# Patient Record
Sex: FEMALE | Race: OTHER | Employment: OTHER | URBAN - METROPOLITAN AREA
[De-identification: names, ages, dates, MRNs, and addresses within clinical notes are randomized per-mention and may not be internally consistent; named-entity substitution may affect disease eponyms.]

---

## 2017-03-01 ENCOUNTER — OFFICE VISIT (OUTPATIENT)
Dept: CARDIOLOGY CLINIC | Age: 77
End: 2017-03-01

## 2017-03-01 DIAGNOSIS — I42.9 CARDIOMYOPATHY (HCC): ICD-10-CM

## 2017-03-01 DIAGNOSIS — I50.22 SYSTOLIC CHF, CHRONIC (HCC): ICD-10-CM

## 2017-03-01 DIAGNOSIS — Z95.810 AICD (AUTOMATIC CARDIOVERTER/DEFIBRILLATOR) PRESENT: Primary | ICD-10-CM

## 2017-05-31 ENCOUNTER — OFFICE VISIT (OUTPATIENT)
Dept: CARDIOLOGY CLINIC | Age: 77
End: 2017-05-31

## 2017-05-31 DIAGNOSIS — Z95.810 AICD (AUTOMATIC CARDIOVERTER/DEFIBRILLATOR) PRESENT: Primary | ICD-10-CM

## 2017-05-31 DIAGNOSIS — I50.22 SYSTOLIC CHF, CHRONIC (HCC): ICD-10-CM

## 2017-05-31 DIAGNOSIS — I42.9 CARDIOMYOPATHY, UNSPECIFIED TYPE (HCC): ICD-10-CM

## 2017-05-31 NOTE — LETTER
5/31/2017 2:09 PM 
 
Ms. [address] [facility] [address] Dear Ms. Cathy Zamora: 
 
Your remote home monitor is functioning properly. You are due for your annual ICD check and visit with Dr. Aline Carroll after June 9, 2017. Please call our office at [phone] and schedule a follow up appointment for your continued care. Sincerely, Danilo Mina RN [address] [place] [address] Cardiology

## 2017-05-31 NOTE — PROGRESS NOTES
See device report - BSC BiV ICD remote send, next remote send in 3 months. Annual reminder letter sent.

## 2017-06-15 ENCOUNTER — CLINICAL SUPPORT (OUTPATIENT)
Dept: CARDIOLOGY CLINIC | Age: 77
End: 2017-06-15

## 2017-06-15 ENCOUNTER — OFFICE VISIT (OUTPATIENT)
Dept: CARDIOLOGY CLINIC | Age: 77
End: 2017-06-15

## 2017-06-15 VITALS
SYSTOLIC BLOOD PRESSURE: 146 MMHG | HEIGHT: 60 IN | WEIGHT: 167.8 LBS | BODY MASS INDEX: 32.94 KG/M2 | DIASTOLIC BLOOD PRESSURE: 70 MMHG | RESPIRATION RATE: 18 BRPM | OXYGEN SATURATION: 97 % | HEART RATE: 62 BPM

## 2017-06-15 DIAGNOSIS — I50.22 SYSTOLIC CHF, CHRONIC (HCC): Primary | ICD-10-CM

## 2017-06-15 DIAGNOSIS — I42.9 CARDIOMYOPATHY, UNSPECIFIED TYPE (HCC): ICD-10-CM

## 2017-06-15 DIAGNOSIS — I10 ESSENTIAL HYPERTENSION: ICD-10-CM

## 2017-06-15 DIAGNOSIS — I25.10 CORONARY ARTERY DISEASE INVOLVING NATIVE CORONARY ARTERY OF NATIVE HEART WITHOUT ANGINA PECTORIS: ICD-10-CM

## 2017-06-15 DIAGNOSIS — I50.22 SYSTOLIC CHF, CHRONIC (HCC): ICD-10-CM

## 2017-06-15 DIAGNOSIS — Z95.810 AICD (AUTOMATIC CARDIOVERTER/DEFIBRILLATOR) PRESENT: ICD-10-CM

## 2017-06-15 DIAGNOSIS — Z95.810 AICD (AUTOMATIC CARDIOVERTER/DEFIBRILLATOR) PRESENT: Primary | ICD-10-CM

## 2017-06-15 RX ORDER — LANOLIN ALCOHOL/MO/W.PET/CERES
1000 CREAM (GRAM) TOPICAL DAILY
COMMUNITY

## 2017-08-30 ENCOUNTER — OFFICE VISIT (OUTPATIENT)
Dept: CARDIOLOGY CLINIC | Age: 77
End: 2017-08-30

## 2017-08-30 DIAGNOSIS — I50.22 SYSTOLIC CHF, CHRONIC (HCC): ICD-10-CM

## 2017-08-30 DIAGNOSIS — Z95.810 AICD (AUTOMATIC CARDIOVERTER/DEFIBRILLATOR) PRESENT: Primary | ICD-10-CM

## 2017-08-30 DIAGNOSIS — I42.9 CARDIOMYOPATHY, UNSPECIFIED TYPE (HCC): ICD-10-CM

## 2017-11-29 ENCOUNTER — OFFICE VISIT (OUTPATIENT)
Dept: CARDIOLOGY CLINIC | Age: 77
End: 2017-11-29

## 2017-11-29 DIAGNOSIS — Z95.810 AICD (AUTOMATIC CARDIOVERTER/DEFIBRILLATOR) PRESENT: Primary | ICD-10-CM

## 2017-11-29 DIAGNOSIS — I42.9 CARDIOMYOPATHY, UNSPECIFIED TYPE (HCC): ICD-10-CM

## 2017-11-29 DIAGNOSIS — I50.22 SYSTOLIC CHF, CHRONIC (HCC): ICD-10-CM

## 2018-02-28 ENCOUNTER — OFFICE VISIT (OUTPATIENT)
Dept: CARDIOLOGY CLINIC | Age: 78
End: 2018-02-28

## 2018-02-28 DIAGNOSIS — I42.9 CARDIOMYOPATHY, UNSPECIFIED TYPE (HCC): ICD-10-CM

## 2018-02-28 DIAGNOSIS — Z95.810 AICD (AUTOMATIC CARDIOVERTER/DEFIBRILLATOR) PRESENT: Primary | ICD-10-CM

## 2018-02-28 DIAGNOSIS — I50.22 SYSTOLIC CHF, CHRONIC (HCC): ICD-10-CM

## 2018-06-06 ENCOUNTER — CLINICAL SUPPORT (OUTPATIENT)
Dept: CARDIOLOGY CLINIC | Age: 78
End: 2018-06-06

## 2018-06-06 DIAGNOSIS — Z95.810 AICD (AUTOMATIC CARDIOVERTER/DEFIBRILLATOR) PRESENT: Primary | ICD-10-CM

## 2018-06-06 DIAGNOSIS — I50.22 SYSTOLIC CHF, CHRONIC (HCC): ICD-10-CM

## 2018-06-06 DIAGNOSIS — I42.9 CARDIOMYOPATHY, UNSPECIFIED TYPE (HCC): ICD-10-CM

## 2018-09-05 ENCOUNTER — CLINICAL SUPPORT (OUTPATIENT)
Dept: CARDIOLOGY CLINIC | Age: 78
End: 2018-09-05

## 2018-09-05 DIAGNOSIS — I42.9 CARDIOMYOPATHY, UNSPECIFIED TYPE (HCC): ICD-10-CM

## 2018-09-05 DIAGNOSIS — Z95.810 PRESENCE OF AUTOMATIC CARDIOVERTER/DEFIBRILLATOR (AICD): Primary | ICD-10-CM

## 2018-11-13 ENCOUNTER — OFFICE VISIT (OUTPATIENT)
Dept: CARDIOLOGY CLINIC | Age: 78
End: 2018-11-13

## 2018-11-13 ENCOUNTER — CLINICAL SUPPORT (OUTPATIENT)
Dept: CARDIOLOGY CLINIC | Age: 78
End: 2018-11-13

## 2018-11-13 VITALS
DIASTOLIC BLOOD PRESSURE: 70 MMHG | WEIGHT: 161.5 LBS | RESPIRATION RATE: 18 BRPM | HEIGHT: 60 IN | HEART RATE: 76 BPM | SYSTOLIC BLOOD PRESSURE: 138 MMHG | BODY MASS INDEX: 31.71 KG/M2 | OXYGEN SATURATION: 97 %

## 2018-11-13 DIAGNOSIS — I42.0 DILATED CARDIOMYOPATHY (HCC): ICD-10-CM

## 2018-11-13 DIAGNOSIS — Z95.810 PRE-OPERATIVE CARDIOVASCULAR EXAMINATION, ICD IN PLACE: ICD-10-CM

## 2018-11-13 DIAGNOSIS — I44.7 LBBB (LEFT BUNDLE BRANCH BLOCK): ICD-10-CM

## 2018-11-13 DIAGNOSIS — I50.22 CHRONIC SYSTOLIC CONGESTIVE HEART FAILURE (HCC): ICD-10-CM

## 2018-11-13 DIAGNOSIS — Z95.810 AICD (AUTOMATIC CARDIOVERTER/DEFIBRILLATOR) PRESENT: Primary | ICD-10-CM

## 2018-11-13 DIAGNOSIS — Z95.810 PRESENCE OF AUTOMATIC CARDIOVERTER/DEFIBRILLATOR (AICD): Primary | ICD-10-CM

## 2018-11-13 DIAGNOSIS — Z01.810 PRE-OPERATIVE CARDIOVASCULAR EXAMINATION, ICD IN PLACE: ICD-10-CM

## 2018-11-13 DIAGNOSIS — I50.22 SYSTOLIC CHF, CHRONIC (HCC): ICD-10-CM

## 2018-11-13 NOTE — PROGRESS NOTES
1. Have you been to the ER, urgent care clinic since your last visit? Hospitalized since your last visit? No 
 
2. Have you seen or consulted any other health care providers outside of the [address] [place] [address] since your last visit? Include any pap smears or colon screening. No 
 
Chief Complaint Patient presents with  Pacemaker Check AICD check up. Denied cardiac symptoms.

## 2018-11-13 NOTE — PROGRESS NOTES
Subjective:  
  
Rafaela Isaac is a 77 y.o. female is here for annual device follow up. The patient denies chest pain/ shortness of breath, orthopnea, PND, LE edema, palpitations, syncope, presyncope or fatigue. Patient Active Problem List  
 Diagnosis Date Noted  Hypertension 06/15/2017  [facility] ([facility] 06/04/2015  Hypothyroidism 04/03/2013  Accelerated hypertension 03/09/2011  Thrombocytopenia ([facility] 03/07/2011  Anemia 03/07/2011  
 D. I.C. (disseminated intravascular coagulation) 03/07/2011  Systolic CHF, chronic ([facility] 03/04/2011  AICD (automatic cardioverter/defibrillator) present 03/04/2011  CAD (coronary artery disease) 09/28/2010  Hyperlipidemia 09/28/2010 Haylie Whitney MD 
Past Medical History:  
Diagnosis Date  Hypercholesterolemia  Hypertension  Thyroid disease Past Surgical History:  
Procedure Laterality Date  CARDIAC SURG PROCEDURE UNLIST  2003  
 angioplasty  HX HEENT    
 cataract  HX PACEMAKER    
 2000/ angioplasty in 2003. Allergies Allergen Reactions  Statins-Hmg-Coa Reductase Inhibitors Myalgia Family History Problem Relation Age of Onset  Heart Disease Brother   
 negative for cardiac disease Social History Socioeconomic History  Marital status:  Spouse name: Not on file  Number of children: Not on file  Years of education: Not on file  Highest education level: Not on file Social Needs  Financial resource strain: Not on file  Food insecurity - worry: Not on file  Food insecurity - inability: Not on file  Transportation needs - medical: Not on file  Transportation needs - non-medical: Not on file Occupational History  Not on file Tobacco Use  Smoking status: Never Smoker  Smokeless tobacco: Never Used Substance and Sexual Activity  Alcohol use: No  
 Drug use: No  
 Sexual activity: No  
Other Topics Concern  Not on file Social History Narrative  Not on file Current Outpatient Medications Medication Sig  cyanocobalamin 1,000 mcg tablet Take 1,000 mcg by mouth daily.  b complex vitamins tablet Take 1 Tab by mouth daily.  ergocalciferol (ERGOCALCIFEROL) 50,000 unit capsule Take 50,000 Units by mouth.  furosemide (LASIX) 40 mg tablet TAKE 1 TABLET BY MOUTH EVERY DAY  metoprolol succinate (TOPROL-XL) 50 mg XL tablet TAKE 1 TABLET BY MOUTH EVERY DAY  losartan (COZAAR) 50 mg tablet TAKE 1 TABLET BY MOUTH EVERY DAY  levothyroxine (SYNTHROID) 75 mcg tablet Take 1 Tab by mouth Daily (before breakfast). For additional refills patient will need to be seen (Patient taking differently: Take 50 mcg by mouth Daily (before breakfast). For additional refills patient will need to be seen)  niacin 250 mg tablet Take 250 mg by mouth. THREE DAYS A WEEK No current facility-administered medications for this visit. Vitals:  
 11/13/18 1528 BP: 138/70 Pulse: 76 Resp: 18 SpO2: 97% Weight: 161 lb 8 oz (73.3 kg) Height: 5' (1.524 m) I have reviewed the nurses notes, vitals, problem list, allergy list, medical history, family, social history and medications. Review of Symptoms: 
 
General: Pt denies excessive weight gain or loss. Pt is able to conduct ADL's HEENT: Denies blurred vision, headaches, epistaxis and difficulty swallowing. Respiratory: Denies shortness of breath, MARKHAM, wheezing or stridor. Cardiovascular: Denies precordial pain, palpitations, edema or PND Gastrointestinal: Denies poor appetite, indigestion, abdominal pain or blood in stool Urinary: Denies dysuria, pyuria Musculoskeletal: Denies pain or swelling from muscles or joints Neurologic: Denies tremor, paresthesias, or sensory motor disturbance Skin: Denies rash, itching or texture change. Psych: Denies depression Physical Exam:   
 
General: Well developed, in no acute distress.  
HEENT: Eyes - PERRL, no jvd 
 Heart:  Normal S1/S2 negative S3 or S4. Regular, no murmur, gallop or rub.  
Respiratory: Clear bilaterally x 4, no wheezing or rales Abdomen:   Soft, non-tender, bowel sounds are active.  
Extremities:  No edema, normal cap refill, no cyanosis. Musculoskeletal: No clubbing Neuro: A&Ox3, speech clear, gait stable. Skin: Skin color is normal. No rashes or lesions. Non diaphoretic Vascular: 2+ pulses symmetric in all extremities Cardiographics Ekg: biv paced 
 
icd - 100% biv paced Results for orders placed or performed during the hospital encounter of 03/04/11 EKG, 12 LEAD, INITIAL Result Value Ref Range Ventricular Rate 81 BPM  
 Atrial Rate 81 BPM  
 P-R Interval 144 ms QRS Duration 142 ms  
 Q-T Interval 464 ms QTC Calculation (Bezet) 539 ms Calculated P Axis 70 degrees Calculated R Axis -143 degrees Calculated T Axis 2 degrees Diagnosis Electronic ventricular pacemaker No previous ECGs available Confirmed by Sandrine Luna ([id number]) on 3/5/2011 2:25:31 PM  
 
 
 
Lab Results Component Value Date/Time WBC 6.4 08/19/2014 09:57 AM  
 HGB 12.2 08/19/2014 09:57 AM  
 HCT 37.0 08/19/2014 09:57 AM  
 PLATELET 073 71/59/2441 09:57 AM  
 MCV 90 08/19/2014 09:57 AM  
  
Lab Results Component Value Date/Time Sodium 140 08/19/2014 09:57 AM  
 Potassium 4.4 08/19/2014 09:57 AM  
 Chloride 101 08/19/2014 09:57 AM  
 CO2 26 08/19/2014 09:57 AM  
 Anion gap 5 03/10/2011 04:00 AM  
 Glucose 100 (H) 08/19/2014 09:57 AM  
 BUN 13 08/19/2014 09:57 AM  
 Creatinine 0.80 08/19/2014 09:57 AM  
 BUN/Creatinine ratio 16 08/19/2014 09:57 AM  
 GFR est AA 83 08/19/2014 09:57 AM  
 GFR est non-AA 72 08/19/2014 09:57 AM  
 Calcium 10.0 08/19/2014 09:57 AM  
 Bilirubin, total 0.6 08/19/2014 09:57 AM  
 AST (SGOT) 21 08/19/2014 09:57 AM  
 Alk.  phosphatase 110 08/19/2014 09:57 AM  
 Protein, total 7.6 08/19/2014 09:57 AM  
 Albumin 4.2 08/19/2014 09:57 AM  
 Globulin 3.9 03/10/2011 04:00 AM  
 A-G Ratio 1.2 08/19/2014 09:57 AM  
 ALT (SGPT) 15 08/19/2014 09:57 AM  
  
 
 Assessment: 
 
 Assessment: ICD-10-CM ICD-9-CM 1. AICD (automatic cardioverter/defibrillator) present Z95.810 V45.02 AMB POC EKG ROUTINE W/ 12 LEADS, INTER & REP 2. Dilated cardiomyopathy (HCC) I42.0 425.4 3. Chronic systolic congestive heart failure (HCC) I50.22 428.22   
  428.0 4. LBBB (left bundle branch block) I44.7 426.3 Orders Placed This Encounter  AMB POC EKG ROUTINE W/ 12 LEADS, INTER & REP Order Specific Question:   Reason for Exam: Answer:   routine Plan: Ms Maurice Peña is doing well. She is 100% biv paced for her cardiomyopathy. She is doing well on her med rx for chf. Cont med rx for htn. She will follow up in the device clinic per routine and with me in one year. Continue medical management for cardiomyopathy, chf and htn. Thank you for allowing me to participate in [address] [place] [address] 's care.  
 
Kathryn Frazier MD, Roger Story

## 2019-02-20 ENCOUNTER — CLINICAL SUPPORT (OUTPATIENT)
Dept: CARDIOLOGY CLINIC | Age: 79
End: 2019-02-20

## 2019-02-20 DIAGNOSIS — I50.22 SYSTOLIC CHF, CHRONIC (HCC): ICD-10-CM

## 2019-02-20 DIAGNOSIS — Z95.810 PRESENCE OF AUTOMATIC CARDIOVERTER/DEFIBRILLATOR (AICD): Primary | ICD-10-CM

## 2019-05-22 ENCOUNTER — CLINICAL SUPPORT (OUTPATIENT)
Dept: CARDIOLOGY CLINIC | Age: 79
End: 2019-05-22

## 2019-05-22 DIAGNOSIS — Z95.810 PRESENCE OF AUTOMATIC CARDIOVERTER/DEFIBRILLATOR (AICD): Primary | ICD-10-CM

## 2019-05-22 DIAGNOSIS — I42.0 DILATED CARDIOMYOPATHY (HCC): ICD-10-CM

## 2019-08-21 ENCOUNTER — OFFICE VISIT (OUTPATIENT)
Dept: CARDIOLOGY CLINIC | Age: 79
End: 2019-08-21

## 2019-08-21 DIAGNOSIS — I42.0 DILATED CARDIOMYOPATHY (HCC): ICD-10-CM

## 2019-08-21 DIAGNOSIS — Z95.810 PRESENCE OF AUTOMATIC CARDIOVERTER/DEFIBRILLATOR (AICD): Primary | ICD-10-CM

## 2019-11-20 ENCOUNTER — OFFICE VISIT (OUTPATIENT)
Dept: CARDIOLOGY CLINIC | Age: 79
End: 2019-11-20

## 2019-11-20 DIAGNOSIS — Z95.810 PRESENCE OF AUTOMATIC CARDIOVERTER/DEFIBRILLATOR (AICD): Primary | ICD-10-CM

## 2019-11-20 DIAGNOSIS — I42.0 DILATED CARDIOMYOPATHY (HCC): ICD-10-CM

## 2020-02-19 ENCOUNTER — CLINICAL SUPPORT (OUTPATIENT)
Dept: CARDIOLOGY CLINIC | Age: 80
End: 2020-02-19

## 2020-02-19 ENCOUNTER — OFFICE VISIT (OUTPATIENT)
Dept: CARDIOLOGY CLINIC | Age: 80
End: 2020-02-19

## 2020-02-19 VITALS
BODY MASS INDEX: 32 KG/M2 | SYSTOLIC BLOOD PRESSURE: 134 MMHG | RESPIRATION RATE: 18 BRPM | DIASTOLIC BLOOD PRESSURE: 60 MMHG | WEIGHT: 163 LBS | HEART RATE: 70 BPM | OXYGEN SATURATION: 98 % | HEIGHT: 60 IN

## 2020-02-19 DIAGNOSIS — Z45.018 PACEMAKER REPROGRAMMING/CHECK: ICD-10-CM

## 2020-02-19 DIAGNOSIS — Z95.810 PRESENCE OF AUTOMATIC CARDIOVERTER/DEFIBRILLATOR (AICD): ICD-10-CM

## 2020-02-19 DIAGNOSIS — I44.7 LBBB (LEFT BUNDLE BRANCH BLOCK): ICD-10-CM

## 2020-02-19 DIAGNOSIS — I42.0 DILATED CARDIOMYOPATHY (HCC): ICD-10-CM

## 2020-02-19 DIAGNOSIS — Z95.810 PRESENCE OF AUTOMATIC CARDIOVERTER/DEFIBRILLATOR (AICD): Primary | ICD-10-CM

## 2020-02-19 DIAGNOSIS — I50.22 SYSTOLIC CHF, CHRONIC (HCC): Primary | ICD-10-CM

## 2020-02-19 RX ORDER — ACETAMINOPHEN 500 MG
TABLET ORAL
COMMUNITY

## 2020-02-19 RX ORDER — NAPROXEN 250 MG/1
TABLET ORAL 2 TIMES DAILY WITH MEALS
COMMUNITY

## 2020-02-19 NOTE — PROGRESS NOTES
1. Have you been to the ER, urgent care clinic since your last visit? Hospitalized since your last visit? No    2. Have you seen or consulted any other health care providers outside of the [address] [place] [address] since your last visit? Include any pap smears or colon screening. No    Chief Complaint   Patient presents with    Pacemaker Check     yearly appt.   C/O SOB with exertion,

## 2020-02-19 NOTE — PROGRESS NOTES
Subjective:      Heather Garrido is a [de-identified] y.o. female is here for EP follow up. The patient denies chest pain/ shortness of breath, orthopnea, PND, LE edema, palpitations, syncope, presyncope. She is aware of fatigue with activities. Able to perform ADLs. She notes PCP has stopped her ACE and diuretic which she has taken for 20 years. Is unsure why. Patient Active Problem List    Diagnosis Date Noted    Hypertension 06/15/2017    Cardiomyopathy ([facility] 06/04/2015    Hypothyroidism 04/03/2013    Accelerated hypertension 03/09/2011    [facility] 03/07/2011    Anemia 03/07/2011    D. I.C. (disseminated intravascular coagulation) 89/69/0959    Systolic CHF, chronic ([facility] 03/04/2011    AICD (automatic cardioverter/defibrillator) present 03/04/2011    CAD (coronary artery disease) 09/28/2010    Hyperlipidemia 09/28/2010      Marcela Tran MD  Past Medical History:   Diagnosis Date    Hypercholesterolemia     Hypertension     Thyroid disease       Past Surgical History:   Procedure Laterality Date    CARDIAC SURG PROCEDURE UNLIST  2003    angioplasty    HX HEENT      cataract    HX PACEMAKER      2000/ angioplasty in 2003. Allergies   Allergen Reactions    Statins-Hmg-Coa Reductase Inhibitors Myalgia      Family History   Problem Relation Age of Onset    Heart Disease Brother     negative for cardiac disease  Social History     Socioeconomic History    Marital status:      Spouse name: Not on file    Number of children: Not on file    Years of education: Not on file    Highest education level: Not on file   Tobacco Use    Smoking status: Never Smoker    Smokeless tobacco: Never Used   Substance and Sexual Activity    Alcohol use: No    Drug use: No    Sexual activity: Never     Current Outpatient Medications   Medication Sig    naproxen (NAPROSYN) 250 mg tablet Take  by mouth two (2) times daily (with meals).     acetaminophen (TYLENOL EXTRA STRENGTH) 500 mg tablet Take  by mouth every six (6) hours as needed for Pain.  cyanocobalamin 1,000 mcg tablet Take 1,000 mcg by mouth daily.  b complex vitamins tablet Take 1 Tab by mouth daily.  ergocalciferol (ERGOCALCIFEROL) 50,000 unit capsule Take 50,000 Units by mouth.  metoprolol succinate (TOPROL-XL) 50 mg XL tablet TAKE 1 TABLET BY MOUTH EVERY DAY    levothyroxine (SYNTHROID) 75 mcg tablet Take 1 Tab by mouth Daily (before breakfast). For additional refills patient will need to be seen (Patient taking differently: Take 50 mcg by mouth Daily (before breakfast). For additional refills patient will need to be seen)    niacin 250 mg tablet Take 250 mg by mouth. THREE DAYS A WEEK      No current facility-administered medications for this visit. Vitals:    02/19/20 0932   BP: 134/60   Pulse: 70   Resp: 18   SpO2: 98%   Weight: 163 lb (73.9 kg)   Height: 5' (1.524 m)       I have reviewed the nurses notes, vitals, problem list, allergy list, medical history, family, social history and medications. Review of Symptoms:    General: Pt denies excessive weight gain or loss. Pt is able to conduct ADL's. Reports fatigue  HEENT: Denies blurred vision, headaches, epistaxis and difficulty swallowing. Respiratory: Denies shortness of breath, MARKHAM, wheezing or stridor. Cardiovascular: Denies precordial pain, palpitations, edema or PND  Gastrointestinal: Denies poor appetite, indigestion, abdominal pain or blood in stool  Urinary: Denies dysuria, pyuria  Musculoskeletal: Denies pain or swelling from muscles or joints  Neurologic: Denies tremor, paresthesias, or sensory motor disturbance  Skin: Denies rash, itching or texture change. Psych: Denies depression      Physical Exam:      General: Well developed, in no acute distress. HEENT: Eyes - PERRL, no jvd  Heart:  Normal S1/S2 negative S3 or S4. Regular, no murmur, gallop or rub.    Respiratory: Clear bilaterally x 4, no wheezing or rales  Extremities:  No edema, normal cap refill, no cyanosis. Musculoskeletal: No clubbing  Neuro: A&Ox3, speech clear, gait stable. Skin: Skin color is normal. No rashes or lesions. Non diaphoretic  Vascular: 2+ pulses symmetric in all extremities    Cardiographics    Ekg: V paced    Results for orders placed or performed during the hospital encounter of 03/04/11   EKG, 12 LEAD, INITIAL   Result Value Ref Range    Ventricular Rate 81 BPM    Atrial Rate 81 BPM    P-R Interval 144 ms    QRS Duration 142 ms    Q-T Interval 464 ms    QTC Calculation (Bezet) 539 ms    Calculated P Axis 70 degrees    Calculated R Axis -143 degrees    Calculated T Axis 2 degrees    Diagnosis       Electronic ventricular pacemaker  No previous ECGs available  Confirmed by Jonel Rcoa ([id number]) on 3/5/2011 2:25:31 PM         Lab Results   Component Value Date/Time    WBC 6.4 08/19/2014 09:57 AM    HGB 12.2 08/19/2014 09:57 AM    HCT 37.0 08/19/2014 09:57 AM    PLATELET 310 94/60/0852 09:57 AM    MCV 90 08/19/2014 09:57 AM      Lab Results   Component Value Date/Time    Sodium 140 08/19/2014 09:57 AM    Potassium 4.4 08/19/2014 09:57 AM    Chloride 101 08/19/2014 09:57 AM    CO2 26 08/19/2014 09:57 AM    Anion gap 5 03/10/2011 04:00 AM    Glucose 100 (H) 08/19/2014 09:57 AM    BUN 13 08/19/2014 09:57 AM    Creatinine 0.80 08/19/2014 09:57 AM    BUN/Creatinine ratio 16 08/19/2014 09:57 AM    GFR est AA 83 08/19/2014 09:57 AM    GFR est non-AA 72 08/19/2014 09:57 AM    Calcium 10.0 08/19/2014 09:57 AM    Bilirubin, total 0.6 08/19/2014 09:57 AM    AST (SGOT) 21 08/19/2014 09:57 AM    Alk. phosphatase 110 08/19/2014 09:57 AM    Protein, total 7.6 08/19/2014 09:57 AM    Albumin 4.2 08/19/2014 09:57 AM    Globulin 3.9 03/10/2011 04:00 AM    A-G Ratio 1.2 08/19/2014 09:57 AM    ALT (SGPT) 15 08/19/2014 09:57 AM      Lab Results   Component Value Date/Time    TSH 6.640 (H) 08/19/2014 09:57 AM        Assessment:           ICD-10-CM ICD-9-CM    1.  Systolic CHF, chronic ([facility] I50.22 428.22 ECHO ADULT COMPLETE     428.0    2. Dilated cardiomyopathy (HCC) I42.0 425.4 ECHO ADULT COMPLETE   3. LBBB (left bundle branch block) I44.7 426.3 ECHO ADULT COMPLETE   4. Presence of automatic cardioverter/defibrillator (AICD) Z95.810 V45.02 ECHO ADULT COMPLETE   5. Pacemaker reprogramming/check Z45.018 V53.31 AMB POC EKG ROUTINE W/ 12 LEADS, INTER & REP      ECHO ADULT COMPLETE     Orders Placed This Encounter    AMB POC EKG ROUTINE W/ 12 LEADS, INTER & REP     Order Specific Question:   Reason for Exam:     Answer:   routine    naproxen (NAPROSYN) 250 mg tablet     Sig: Take  by mouth two (2) times daily (with meals).  acetaminophen (TYLENOL EXTRA STRENGTH) 500 mg tablet     Sig: Take  by mouth every six (6) hours as needed for Pain. Plan:     Ms Molly Masterson is here for annual follow up and device check. She is  doing well. She is 100% biv paced for her cardiomyopathy. No events. She is on BB  rx for chf. Will request records from PCP with the reason for d/c of her ARB (cardiomyopathy). Would benefit from ARB/ACE with her BB as cardiomyopathy treatment. Cont med rx for htn. Echo to assess EF. She will follow up in the device clinic per routine and with me in one year.      Continue medical management for cardiomyopathy, chf and htn.     Thank you for allowing me to participate in [address] 's care.       Lisa Kaiser NP

## 2020-03-09 ENCOUNTER — TELEPHONE (OUTPATIENT)
Dept: CARDIOLOGY CLINIC | Age: 80
End: 2020-03-09

## 2020-03-09 DIAGNOSIS — I50.22 SYSTOLIC CHF, CHRONIC (HCC): Primary | ICD-10-CM

## 2020-03-09 NOTE — TELEPHONE ENCOUNTER
----- Message from CURTIS Coates sent at 3/9/2020 10:57 AM EDT -----  Please let her know that her heart is weak. She needs to be on entresto. I believe we requested her labs from PCP which I have not seen. Will need to draw a CMP prior to starting her medications.  Does she have a general cardiologist?  Thanks

## 2020-03-09 NOTE — TELEPHONE ENCOUNTER
Verified patient with 2 identifiers   Patient advised that her heart is weak and that Steven Crowe, CURTIS advises to start entresto. Per patient has not had recent labs checked. Advised I would leave a lab slip up front for . Advised patient to pick lab slip up and go to lab elio to have blood drawn. Patient agrees. Advised med will not be ordered until Steven ACEVEDO reviews her lab results.  Patient states she does not have a general cardiologist - only sees Dr Sofya Chaney.

## 2020-03-11 ENCOUNTER — HOSPITAL ENCOUNTER (OUTPATIENT)
Dept: LAB | Age: 80
Discharge: HOME | End: 2020-03-11
Payer: MEDICARE

## 2020-03-11 PROCEDURE — 36415 COLL VENOUS BLD VENIPUNCTURE: CPT

## 2020-03-11 PROCEDURE — 80053 COMPREHEN METABOLIC PANEL: CPT

## 2020-03-12 LAB
ALBUMIN SERPL-MCNC: 4 G/DL (ref 3.7–4.7)
ALBUMIN/GLOB SERPL: 1.4 {RATIO} (ref 1.2–2.2)
ALP SERPL-CCNC: 100 IU/L (ref 39–117)
ALT SERPL-CCNC: 14 IU/L (ref 0–32)
AST SERPL-CCNC: 27 IU/L (ref 0–40)
BILIRUB SERPL-MCNC: 0.4 MG/DL (ref 0–1.2)
BUN SERPL-MCNC: 11 MG/DL (ref 8–27)
BUN/CREAT SERPL: 14 (ref 12–28)
CALCIUM SERPL-MCNC: 9.4 MG/DL (ref 8.7–10.3)
CHLORIDE SERPL-SCNC: 103 MMOL/L (ref 96–106)
CO2 SERPL-SCNC: 23 MMOL/L (ref 20–29)
CREAT SERPL-MCNC: 0.8 MG/DL (ref 0.57–1)
GLOBULIN SER CALC-MCNC: 2.9 G/DL (ref 1.5–4.5)
GLUCOSE SERPL-MCNC: 93 MG/DL (ref 65–99)
POTASSIUM SERPL-SCNC: 4.9 MMOL/L (ref 3.5–5.2)
PROT SERPL-MCNC: 6.9 G/DL (ref 6–8.5)
SODIUM SERPL-SCNC: 138 MMOL/L (ref 134–144)

## 2020-03-12 RX ORDER — FUROSEMIDE 20 MG/1
20 TABLET ORAL AS NEEDED
Qty: 30 TAB | Refills: 1 | Status: SHIPPED | OUTPATIENT
Start: 2020-03-12 | End: 2020-04-06

## 2020-03-12 RX ORDER — SACUBITRIL AND VALSARTAN 24; 26 MG/1; MG/1
1 TABLET, FILM COATED ORAL 2 TIMES DAILY
Qty: 60 TAB | Refills: 3 | Status: SHIPPED | OUTPATIENT
Start: 2020-03-12 | End: 2020-07-07

## 2020-03-13 ENCOUNTER — TELEPHONE (OUTPATIENT)
Dept: CARDIOLOGY CLINIC | Age: 80
End: 2020-03-13

## 2020-03-13 NOTE — TELEPHONE ENCOUNTER
----- Message from Tiffanie Sloan ANP sent at 3/12/2020 11:56 AM EDT -----  Labs are great. Lets start entresto and prn lasix. I will send Rx. Follow up in 90 days with echo.

## 2020-03-30 NOTE — TELEPHONE ENCOUNTER
Verified patient with 2 identifiers   Spoke with patient regarding normal labs. Advised to start Entresto and lasix as needed. Will fax to  to call patient to schedule an echo in 3 months.

## 2020-04-06 RX ORDER — FUROSEMIDE 20 MG/1
TABLET ORAL
Qty: 30 TAB | Refills: 1 | Status: SHIPPED | OUTPATIENT
Start: 2020-04-06 | End: 2020-05-04

## 2020-05-04 RX ORDER — FUROSEMIDE 20 MG/1
TABLET ORAL
Qty: 30 TAB | Refills: 1 | Status: SHIPPED | OUTPATIENT
Start: 2020-05-04 | End: 2020-06-01

## 2020-05-21 ENCOUNTER — OFFICE VISIT (OUTPATIENT)
Dept: CARDIOLOGY CLINIC | Age: 80
End: 2020-05-21

## 2020-05-21 DIAGNOSIS — Z95.810 AICD (AUTOMATIC CARDIOVERTER/DEFIBRILLATOR) PRESENT: Primary | ICD-10-CM

## 2020-05-21 DIAGNOSIS — I50.22 CHRONIC SYSTOLIC CONGESTIVE HEART FAILURE (HCC): ICD-10-CM

## 2020-06-01 RX ORDER — FUROSEMIDE 20 MG/1
TABLET ORAL
Qty: 30 TAB | Refills: 1 | Status: SHIPPED | OUTPATIENT
Start: 2020-06-01 | End: 2020-06-30

## 2020-06-09 ENCOUNTER — TELEPHONE (OUTPATIENT)
Dept: CARDIOLOGY CLINIC | Age: 80
End: 2020-06-09

## 2020-06-09 NOTE — TELEPHONE ENCOUNTER
Pt called saying a prescription for entresto was sent to the pharmacy, she wants to know does she need to continue that medication, please give her a call back    thanks

## 2020-06-09 NOTE — TELEPHONE ENCOUNTER
Verified patient with 2 identifiers   Advised patient that yes she should be taking entresto. Advised I did call [facility] and confirmed there was a refill ready for . Patient verified understanding.

## 2020-06-30 RX ORDER — FUROSEMIDE 20 MG/1
TABLET ORAL
Qty: 30 TAB | Refills: 1 | Status: SHIPPED | OUTPATIENT
Start: 2020-06-30 | End: 2020-07-24

## 2020-07-07 RX ORDER — SACUBITRIL AND VALSARTAN 24; 26 MG/1; MG/1
TABLET, FILM COATED ORAL
Qty: 60 TAB | Refills: 3 | Status: SHIPPED | OUTPATIENT
Start: 2020-07-07 | End: 2020-10-29

## 2020-07-24 RX ORDER — FUROSEMIDE 20 MG/1
TABLET ORAL
Qty: 30 TAB | Refills: 1 | Status: SHIPPED | OUTPATIENT
Start: 2020-07-24 | End: 2020-08-18

## 2020-08-18 RX ORDER — FUROSEMIDE 20 MG/1
TABLET ORAL
Qty: 30 TAB | Refills: 1 | Status: SHIPPED | OUTPATIENT
Start: 2020-08-18 | End: 2020-09-11 | Stop reason: SDUPTHER

## 2020-08-20 ENCOUNTER — OFFICE VISIT (OUTPATIENT)
Dept: CARDIOLOGY CLINIC | Age: 80
End: 2020-08-20
Payer: MEDICARE

## 2020-08-20 DIAGNOSIS — Z95.810 AICD (AUTOMATIC CARDIOVERTER/DEFIBRILLATOR) PRESENT: Primary | ICD-10-CM

## 2020-08-20 DIAGNOSIS — I42.0 DILATED CARDIOMYOPATHY (HCC): ICD-10-CM

## 2020-08-20 PROCEDURE — 93296 REM INTERROG EVL PM/IDS: CPT | Performed by: INTERNAL MEDICINE

## 2020-08-20 PROCEDURE — 93295 DEV INTERROG REMOTE 1/2/MLT: CPT | Performed by: INTERNAL MEDICINE

## 2020-09-09 DIAGNOSIS — I44.7 LBBB (LEFT BUNDLE BRANCH BLOCK): ICD-10-CM

## 2020-09-09 DIAGNOSIS — Z95.810 PRESENCE OF AUTOMATIC CARDIOVERTER/DEFIBRILLATOR (AICD): ICD-10-CM

## 2020-09-09 DIAGNOSIS — I42.0 DILATED CARDIOMYOPATHY (HCC): ICD-10-CM

## 2020-09-09 DIAGNOSIS — Z95.810 AICD (AUTOMATIC CARDIOVERTER/DEFIBRILLATOR) PRESENT: ICD-10-CM

## 2020-09-09 DIAGNOSIS — I42.0 DILATED CARDIOMYOPATHY (HCC): Primary | ICD-10-CM

## 2020-09-09 NOTE — PROGRESS NOTES
Subjective:      Marita Valadez is a 79 y.o. female is here for EP follow up. The patient denies chest pain/ shortness of breath, orthopnea, PND, LE edema, palpitations, syncope, presyncope or fatigue. Reports feeling markedly better on entresto. She has leg burning and restlessness at night. Does not occur nightly. Patient Active Problem List    Diagnosis Date Noted    Hypertension 06/15/2017    Cardiomyopathy ([facility] 06/04/2015    Hypothyroidism 04/03/2013    Accelerated hypertension 03/09/2011    Thrombocytopenia ([facility] 03/07/2011    Anemia 03/07/2011    D. I.C. (disseminated intravascular coagulation) 30/93/7120    Systolic CHF, chronic ([facility] 03/04/2011    AICD (automatic cardioverter/defibrillator) present 03/04/2011    CAD (coronary artery disease) 09/28/2010    Hyperlipidemia 09/28/2010      Nick De Jesus MD  Past Medical History:   Diagnosis Date    Hypercholesterolemia     Hypertension     Thyroid disease       Past Surgical History:   Procedure Laterality Date    CARDIAC SURG PROCEDURE UNLIST  2003    angioplasty    HX HEENT      cataract    HX PACEMAKER      2000/ angioplasty in 2003.      Allergies   Allergen Reactions    Statins-Hmg-Coa Reductase Inhibitors Myalgia      Family History   Problem Relation Age of Onset    Heart Disease Brother     negative for cardiac disease  Social History     Socioeconomic History    Marital status:      Spouse name: Not on file    Number of children: Not on file    Years of education: Not on file    Highest education level: Not on file   Tobacco Use    Smoking status: Never Smoker    Smokeless tobacco: Never Used   Substance and Sexual Activity    Alcohol use: No    Drug use: No    Sexual activity: Never     Current Outpatient Medications   Medication Sig    omeprazole (PRILOSEC) 40 mg capsule TAKE 1 CAPSULE BY MOUTH DAILY IN THE EVENING    furosemide (LASIX) 20 mg tablet TAKE 1 TABLET BY MOUTH AS NEEDED FOR SWELLING/SHORTNESS OF BREATH    Entresto 24-26 mg tablet TAKE 1 TABLET BY MOUTH TWICE A DAY    naproxen (NAPROSYN) 250 mg tablet Take  by mouth two (2) times daily (with meals).  acetaminophen (TYLENOL EXTRA STRENGTH) 500 mg tablet Take  by mouth every six (6) hours as needed for Pain.  cyanocobalamin 1,000 mcg tablet Take 1,000 mcg by mouth daily.  b complex vitamins tablet Take 1 Tab by mouth daily.  ergocalciferol (ERGOCALCIFEROL) 50,000 unit capsule Take 50,000 Units by mouth.  metoprolol succinate (TOPROL-XL) 50 mg XL tablet TAKE 1 TABLET BY MOUTH EVERY DAY    levothyroxine (SYNTHROID) 75 mcg tablet Take 1 Tab by mouth Daily (before breakfast). For additional refills patient will need to be seen (Patient taking differently: Take 50 mcg by mouth Daily (before breakfast). For additional refills patient will need to be seen)    niacin 250 mg tablet Take 250 mg by mouth. THREE DAYS A WEEK      No current facility-administered medications for this visit. Vitals:    09/10/20 0859 09/10/20 1053   BP: 158/76 130/86   Pulse: 66    Resp: 16    SpO2: 98%    Weight: 161 lb 4.8 oz (73.2 kg)    Height: 5' (1.524 m)        I have reviewed the nurses notes, vitals, problem list, allergy list, medical history, family, social history and medications. Review of Symptoms:    General: Pt denies excessive weight gain or loss. Pt is able to conduct ADL's  HEENT: Denies blurred vision, headaches, epistaxis and difficulty swallowing. Respiratory: Denies shortness of breath, MARKHAM, wheezing or stridor. Cardiovascular: Denies precordial pain, palpitations, edema or PND  Gastrointestinal: Denies poor appetite, indigestion, abdominal pain or blood in stool  Urinary: Denies dysuria, pyuria  Musculoskeletal: Denies pain or swelling from muscles or joints  Neurologic: Denies tremor, paresthesias, or sensory motor disturbance  Skin: Denies rash, itching or texture change.   Psych: Denies depression      Physical Exam:      General: Well developed, in no acute distress. HEENT: Eyes - PERRL, no jvd  Heart:  Normal S1/S2 negative S3 or S4. Regular, no murmur, gallop or rub. Respiratory: Clear bilaterally x 4, no wheezing or rales  Extremities:  No edema, normal cap refill, no cyanosis. Musculoskeletal: No clubbing  Neuro: A&Ox3, speech clear, gait stable. Skin: Skin color is normal. No rashes or lesions. Non diaphoretic. no ulcers  Vascular: 2+ pulses symmetric in all extremities  Psych - judgement intact and orientation is wnl       Cardiographics    Ekg: AV Paced    Results for orders placed or performed during the hospital encounter of 03/04/11   EKG, 12 LEAD, INITIAL   Result Value Ref Range    Ventricular Rate 81 BPM    Atrial Rate 81 BPM    P-R Interval 144 ms    QRS Duration 142 ms    Q-T Interval 464 ms    QTC Calculation (Bezet) 539 ms    Calculated P Axis 70 degrees    Calculated R Axis -143 degrees    Calculated T Axis 2 degrees    Diagnosis       Electronic ventricular pacemaker  No previous ECGs available  Confirmed by Anjel Stauffer ([id number]) on 3/5/2011 2:25:31 PM     Echo today -  PRELIM  · LV: Estimated LVEF is 55 - 60%. Normal cavity size, wall thickness and systolic function (ejection fraction normal). Wall motion: normal. Mild (grade 1) left ventricular diastolic dysfunction. · MV: Mild mitral valve regurgitation is present. · TV: Mild to moderate tricuspid valve regurgitation is present. · PA: Pulmonary arterial systolic pressure is 26 mmHg.          Lab Results   Component Value Date/Time    WBC 6.4 08/19/2014 09:57 AM    HGB 12.2 08/19/2014 09:57 AM    HCT 37.0 08/19/2014 09:57 AM    PLATELET 957 43/17/9411 09:57 AM    MCV 90 08/19/2014 09:57 AM      Lab Results   Component Value Date/Time    Sodium 138 03/11/2020 09:46 AM    Potassium 4.9 03/11/2020 09:46 AM    Chloride 103 03/11/2020 09:46 AM    CO2 23 03/11/2020 09:46 AM    Anion gap 5 03/10/2011 04:00 AM    Glucose 93 03/11/2020 09:46 AM BUN 11 03/11/2020 09:46 AM    Creatinine 0.80 03/11/2020 09:46 AM    BUN/Creatinine ratio 14 03/11/2020 09:46 AM    GFR est AA 81 03/11/2020 09:46 AM    GFR est non-AA 70 03/11/2020 09:46 AM    Calcium 9.4 03/11/2020 09:46 AM    Bilirubin, total 0.4 03/11/2020 09:46 AM    Alk. phosphatase 100 03/11/2020 09:46 AM    Protein, total 6.9 03/11/2020 09:46 AM    Albumin 4.0 03/11/2020 09:46 AM    Globulin 3.9 03/10/2011 04:00 AM    A-G Ratio 1.4 03/11/2020 09:46 AM    ALT (SGPT) 14 03/11/2020 09:46 AM      Lab Results   Component Value Date/Time    TSH 6.640 (H) 08/19/2014 09:57 AM        Assessment:           ICD-10-CM ICD-9-CM    1. Dilated cardiomyopathy (HCC)  I42.0 425.4 AMB POC EKG ROUTINE W/ 12 LEADS, INTER & REP   2. Systolic CHF, chronic (HCC)  I50.22 428.22      428.0    3. AICD (automatic cardioverter/defibrillator) present  Z95.810 V45.02    4. Essential hypertension  I10 401.9      Orders Placed This Encounter    AMB POC EKG ROUTINE W/ 12 LEADS, INTER & REP     Order Specific Question:   Reason for Exam:     Answer:   routine    omeprazole (PRILOSEC) 40 mg capsule     Sig: TAKE 1 CAPSULE BY MOUTH DAILY IN THE EVENING        Plan:     Ms Sebastien Munoz is here for annual follow up. She is  doing well. She is 100% biv paced for her cardiomyopathy. No events. She has been on bb and entresto for > 90 days. Echo today (prelim) 55-60%. Will wait for results. We discussed taking furosemide PRN. She will follow up with PCP re: legs as she does not have significant varicosities that concern me for venous insufficiency. She will consider PT once COVID settles. She is enrolled in remote monitoring and I will see her back in 1 year.       She will follow up in the device clinic per routine and with me in one year. Thank you for allowing me to participate in [address] 's care.       Jenn Priest NP

## 2020-09-10 ENCOUNTER — OFFICE VISIT (OUTPATIENT)
Dept: CARDIOLOGY CLINIC | Age: 80
End: 2020-09-10
Payer: COMMERCIAL

## 2020-09-10 ENCOUNTER — ANCILLARY PROCEDURE (OUTPATIENT)
Dept: CARDIOLOGY CLINIC | Age: 80
End: 2020-09-10

## 2020-09-10 VITALS
RESPIRATION RATE: 16 BRPM | OXYGEN SATURATION: 98 % | DIASTOLIC BLOOD PRESSURE: 86 MMHG | HEIGHT: 60 IN | HEART RATE: 66 BPM | BODY MASS INDEX: 31.67 KG/M2 | WEIGHT: 161.3 LBS | SYSTOLIC BLOOD PRESSURE: 130 MMHG

## 2020-09-10 VITALS
SYSTOLIC BLOOD PRESSURE: 134 MMHG | HEIGHT: 60 IN | DIASTOLIC BLOOD PRESSURE: 60 MMHG | BODY MASS INDEX: 32 KG/M2 | WEIGHT: 163 LBS

## 2020-09-10 DIAGNOSIS — I42.0 DILATED CARDIOMYOPATHY (HCC): Primary | ICD-10-CM

## 2020-09-10 DIAGNOSIS — Z95.810 AICD (AUTOMATIC CARDIOVERTER/DEFIBRILLATOR) PRESENT: ICD-10-CM

## 2020-09-10 DIAGNOSIS — I50.22 SYSTOLIC CHF, CHRONIC (HCC): ICD-10-CM

## 2020-09-10 DIAGNOSIS — I10 ESSENTIAL HYPERTENSION: ICD-10-CM

## 2020-09-10 PROCEDURE — 93000 ELECTROCARDIOGRAM COMPLETE: CPT | Performed by: NURSE PRACTITIONER

## 2020-09-10 PROCEDURE — 99214 OFFICE O/P EST MOD 30 MIN: CPT | Performed by: NURSE PRACTITIONER

## 2020-09-10 PROCEDURE — 93306 TTE W/DOPPLER COMPLETE: CPT | Performed by: INTERNAL MEDICINE

## 2020-09-10 RX ORDER — OMEPRAZOLE 40 MG/1
CAPSULE, DELAYED RELEASE ORAL
COMMUNITY
Start: 2020-06-19

## 2020-09-10 NOTE — PROGRESS NOTES
1. Have you been to the ER, urgent care clinic since your last visit? Hospitalized since your last visit? No    2. Have you seen or consulted any other health care providers outside of the [address] [place] [address] since your last visit? Include any pap smears or colon screening. No    Chief Complaint   Patient presents with    Cardiomyopathy     follow up    CHF     follow up     Patient denies cardiac symptoms.

## 2020-09-11 ENCOUNTER — TELEPHONE (OUTPATIENT)
Dept: CARDIOLOGY CLINIC | Age: 80
End: 2020-09-11

## 2020-09-11 LAB
ECHO AO ASC DIAM: 3.56 CM
ECHO AO ROOT DIAM: 2.77 CM
ECHO AV PEAK GRADIENT: 3.78 MMHG
ECHO AV PEAK VELOCITY: 97.22 CM/S
ECHO EST RA PRESSURE: 3 MMHG
ECHO LA VOL 2C: 49.3 ML (ref 22–52)
ECHO LA VOL 4C: 30.28 ML (ref 22–52)
ECHO LA VOLUME INDEX A2C: 28.94 ML/M2 (ref 16–28)
ECHO LA VOLUME INDEX A4C: 17.77 ML/M2 (ref 16–28)
ECHO LV INTERNAL DIMENSION DIASTOLIC: 4.61 CM (ref 3.9–5.3)
ECHO LV INTERNAL DIMENSION SYSTOLIC: 2.81 CM
ECHO LV ISOVOLUMETRIC RELAXATION TIME (IVRT): 0.08 S
ECHO LV IVSD: 1.01 CM (ref 0.6–0.9)
ECHO LV MASS 2D: 155 G (ref 67–162)
ECHO LV MASS INDEX 2D: 91 G/M2 (ref 43–95)
ECHO LV POSTERIOR WALL DIASTOLIC: 0.95 CM (ref 0.6–0.9)
ECHO LVOT PEAK GRADIENT: 2.01 MMHG
ECHO LVOT PEAK VELOCITY: 70.96 CM/S
ECHO MV "A" WAVE DURATION: 0.18 S
ECHO MV A VELOCITY: 98.73 CENTIMETER/SECOND
ECHO MV E DECELERATION TIME (DT): 0.24 S
ECHO MV E VELOCITY: 80.17 CENTIMETER/SECOND
ECHO PVEIN A DURATION: 0.14 S
ECHO PVEIN A VELOCITY: 28.66 CM/S
ECHO RIGHT VENTRICULAR SYSTOLIC PRESSURE (RVSP): 25.28 MMHG
ECHO TV REGURGITANT MAX VELOCITY: 236.02 CM/S
ECHO TV REGURGITANT PEAK GRADIENT: 22.28 MMHG

## 2020-09-11 RX ORDER — FUROSEMIDE 20 MG/1
TABLET ORAL
Qty: 90 TAB | Refills: 1 | Status: SHIPPED | OUTPATIENT
Start: 2020-09-11 | End: 2021-03-22

## 2020-09-11 NOTE — PROGRESS NOTES
Please let her know that with her medications her ejection fraction has improved. Continue same medications.

## 2020-09-11 NOTE — TELEPHONE ENCOUNTER
Verified patient with 2 identifiers   Advised patient of normal results and to continue the same meds  Patient verified understanding.

## 2020-09-11 NOTE — TELEPHONE ENCOUNTER
----- Message from Dagoberto Holter, ANP sent at 9/11/2020  2:18 PM EDT -----  Please let her know that with her medications her ejection fraction has improved. Continue same medications.

## 2020-09-11 NOTE — TELEPHONE ENCOUNTER
Fax received from [employer] on file requesting 90 day supply on furosemide. Last filled on 8/18/20 # 30 with one refill.

## 2020-11-19 ENCOUNTER — OFFICE VISIT (OUTPATIENT)
Dept: CARDIOLOGY CLINIC | Age: 80
End: 2020-11-19
Payer: MEDICARE

## 2020-11-19 DIAGNOSIS — Z95.810 AICD (AUTOMATIC CARDIOVERTER/DEFIBRILLATOR) PRESENT: Primary | ICD-10-CM

## 2020-11-19 DIAGNOSIS — I42.0 DILATED CARDIOMYOPATHY (HCC): ICD-10-CM

## 2020-11-19 PROCEDURE — 93295 DEV INTERROG REMOTE 1/2/MLT: CPT | Performed by: INTERNAL MEDICINE

## 2021-02-25 ENCOUNTER — OFFICE VISIT (OUTPATIENT)
Dept: CARDIOLOGY CLINIC | Age: 81
End: 2021-02-25
Payer: MEDICARE

## 2021-02-25 DIAGNOSIS — I42.0 DILATED CARDIOMYOPATHY (HCC): ICD-10-CM

## 2021-02-25 DIAGNOSIS — Z95.810 AICD (AUTOMATIC CARDIOVERTER/DEFIBRILLATOR) PRESENT: Primary | ICD-10-CM

## 2021-02-25 PROCEDURE — 93295 DEV INTERROG REMOTE 1/2/MLT: CPT | Performed by: INTERNAL MEDICINE

## 2021-03-01 ENCOUNTER — TELEPHONE (OUTPATIENT)
Dept: CARDIOLOGY CLINIC | Age: 81
End: 2021-03-01

## 2021-03-01 NOTE — TELEPHONE ENCOUNTER
Patients daughter in Rody Daniels is a nurse at [facility] that I send a link via e mail so that patient could get the covid vaccine at [facility]  Advised we do not have a link, we are not sending links from this office. Understanding verified.

## 2021-03-04 ENCOUNTER — TELEPHONE (OUTPATIENT)
Dept: CARDIOLOGY CLINIC | Age: 81
End: 2021-03-04

## 2021-03-04 NOTE — TELEPHONE ENCOUNTER
Yulisa Ruelas, Caleb Norris, LPN   Caller: Unspecified (Today,  9:42 AM)             I have her taking 50mg daily. I did not get the refill. Continue with what she was taking.    Thanks

## 2021-03-04 NOTE — TELEPHONE ENCOUNTER
Verified patient with 2 identifiers   Patient states she picked up a refill on metoprolol 50 mg a few days ago  Yesterday she went to refill her thyroid med and they gave he metoprolol 25 mg tabs. Patient questioned which dose she should be taking. Advised per last OV note she should be taking metoprolol 50 mg daily. Advised that pharmacy may have made a mistake.

## 2021-03-22 RX ORDER — FUROSEMIDE 20 MG/1
TABLET ORAL
Qty: 90 TAB | Refills: 1 | Status: SHIPPED | OUTPATIENT
Start: 2021-03-22

## 2021-05-27 ENCOUNTER — OFFICE VISIT (OUTPATIENT)
Dept: CARDIOLOGY CLINIC | Age: 81
End: 2021-05-27
Payer: MEDICARE

## 2021-05-27 DIAGNOSIS — I42.0 DILATED CARDIOMYOPATHY (HCC): ICD-10-CM

## 2021-05-27 DIAGNOSIS — I50.22 CHRONIC SYSTOLIC CONGESTIVE HEART FAILURE (HCC): ICD-10-CM

## 2021-05-27 DIAGNOSIS — Z95.810 PRESENCE OF AUTOMATIC CARDIOVERTER/DEFIBRILLATOR (AICD): Primary | ICD-10-CM

## 2021-05-27 PROCEDURE — 93296 REM INTERROG EVL PM/IDS: CPT | Performed by: INTERNAL MEDICINE

## 2021-05-27 PROCEDURE — 93295 DEV INTERROG REMOTE 1/2/MLT: CPT | Performed by: INTERNAL MEDICINE

## 2021-08-03 PROBLEM — I10 HYPERTENSION: Status: RESOLVED | Noted: 2017-06-15 | Resolved: 2021-08-03

## 2023-05-12 RX ORDER — ERGOCALCIFEROL 1.25 MG/1
50000 CAPSULE ORAL
COMMUNITY

## 2023-05-12 RX ORDER — FUROSEMIDE 20 MG/1
TABLET ORAL
COMMUNITY
Start: 2021-03-22

## 2023-05-12 RX ORDER — LEVOTHYROXINE SODIUM 0.07 MG/1
75 TABLET ORAL
COMMUNITY
Start: 2015-08-13

## 2023-05-12 RX ORDER — METOPROLOL SUCCINATE 50 MG/1
1 TABLET, EXTENDED RELEASE ORAL DAILY
COMMUNITY
Start: 2015-12-30

## 2023-05-12 RX ORDER — ACETAMINOPHEN 500 MG
TABLET ORAL EVERY 6 HOURS PRN
COMMUNITY

## 2023-05-12 RX ORDER — NAPROXEN 250 MG/1
TABLET ORAL 2 TIMES DAILY WITH MEALS
COMMUNITY

## 2023-05-12 RX ORDER — NIACIN 250 MG
250 TABLET ORAL
COMMUNITY

## 2023-05-12 RX ORDER — SACUBITRIL AND VALSARTAN 24; 26 MG/1; MG/1
1 TABLET, FILM COATED ORAL 2 TIMES DAILY
COMMUNITY
Start: 2021-07-28

## 2023-05-12 RX ORDER — OMEPRAZOLE 40 MG/1
CAPSULE, DELAYED RELEASE ORAL
COMMUNITY
Start: 2020-06-19